# Patient Record
(demographics unavailable — no encounter records)

---

## 2025-05-27 NOTE — REASON FOR VISIT
[Initial Visit] : an initial visit for [FreeTextEntry2] : Left knee pain. Referred by Dr. Juarez Durand

## 2025-05-27 NOTE — CONSULT LETTER
[Dear  ___] : Dear  [unfilled], [FreeTextEntry1] : I had the pleasure of evaluating your patient in the office today for left knee pain secondary to lateral meniscus tear I have enclosed a copy of today's office notes for your charts and for your review.  Sincerely,   Chuy Haas M.D. Professor and

## 2025-05-27 NOTE — DISCUSSION/SUMMARY
[de-identified] : Patient is a pleasant 60 year old Female, with left knee pain secondary to lateral meniscus tear. A lengthy discussion was held regarding the patient's condition and treatment options including all risks, benefits, prognosis and outcomes of each were discussed in detail.  A discussion regarding conservative management at length which consists of 1) Monitor 2) Ice 3) NSAIDs 4) Exercise. I also discussed the possibility of shin pain from varicose veins. Patient will follow up with vascular surgery regarding varicose veins. Patient agrees with plan for conservative management at this time. If patient fails conservative treatment, discussion regarding other treatment options such as surgery will be discussed at next visit. The patient expressed understanding and all questions were answered. The patient will contact me on how she/he is doing otherwise follow up will be  in 2 months.

## 2025-05-27 NOTE — HISTORY OF PRESENT ILLNESS
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 She fell on all fours and hurt onto concrete sidewalk. She was taken to [facility]. She had continued pain and had x-rays and MRI done. She has pain in front of her knee and her shin really hurts. She has no pain at rest but increases randomly. Pain is intermittent. She does not take any pain meds. Has been doing physical therapy with no relief of pain. She denies any numbness or tingling.  Hx: hypothyroidism,

## 2025-05-27 NOTE — PHYSICAL EXAM
[de-identified] : Patient is a pleasant 60 year old Female, AAOx3 with no apparent distress, 41.6 BMI. Physical examination of left knee reveals normal contours with no deformity, skin intact, with no signs of infection, no erythema, no swelling, no discoloration, no distal lymphedema, no patholaxity, no muscle atrophy noted. ROM of left knee reveals flex/ext: 0-120. Motor strength 5/5 throughout the arc of motion. No neurological deficits noted. LLE Thigh 59cm, RLE Thigh 59cm, LLE Calf 45cm, RLE Calf 45cm.   [de-identified] : X-rays were obtained, and reviewed by me today (HSS, 3/4/25): 3 views of the left knee demonstrate normal alignment with no acute fracture or dislocation.   MRI obtained and reviewed by me today (HSS, 4/22/25): Demonstrating horizontal tear of body and anterior horn of lateral meniscus with parameniscal cyst.

## 2025-06-11 NOTE — PHYSICAL EXAM
[FreeTextEntry1] : PE: Constitutional: In NAD, calm and cooperative MSK  Inspection: no gross swelling identified  Palpation: No tenderness of the bilateral lower lumbar paraspinals, no significant TTP over L lower leg, mild TTP over posterior neck  Strength: 5/5 strength in bilateral upper and lower extremities  Reflexes: 2+ reflexes in bilateral UE and LE, negative clonus bilaterally, negative hoffmans bilaterally  Sensation: Intact to light touch in bilateral upper and lower extremities Special tests: Seated SLR: negative bilaterally Facet loading: positive bilaterally

## 2025-06-11 NOTE — DATA REVIEWED
[FreeTextEntry1] : MR L Knee 5/12/25 reviewed CBC 3/2021 reviewed CMP 3/2021 reviewed MR L Tib/Fib 5/2025 reviewed

## 2025-06-11 NOTE — ASSESSMENT
[FreeTextEntry1] : Ms. NILSON ALICIA is a 60 year old female who presents with persistent L anterior shin pain s/p fall, of unclear etiology at this time, possibly related to underlying stenosis. She also complains of chronic neck pain since fall, likely from underlying spondylosis. Denies any red flag signs. Will recommend: - Will obtain MRI C/L Spine - She will try Lidocaine 4% patches, 12 hours on and off - Continue PT 2-3x/week for stretching, strengthening, ROM exercises, HEP and modalities PRN including myofascial release, moist heat  RTC after imaging. Patient aware of red flag signs including any changes to their bowel/bladder control, groin numbness or new weakness. Patient knows to seek immediate attention by calling 911 or going to nearest ER if these symptoms appear.   This patient is being managed for a complex chronic pain that requires ongoing medical management. The nature of this condition requires a longitudinal relationship and monitoring over time for appropriate treatment.

## 2025-06-11 NOTE — HISTORY OF PRESENT ILLNESS
[FreeTextEntry1] : Ms. NILSON ALICIA is a 60 year old female who presents with leg and neck pain   Location: L superior shin, but also in posterior neck/upper back, no significant low back pain Onset: Fell on 3/1/2025, fell on sidewalk, fell forward onto bilateral shins and bracing with hands Provocation/Palliative: Worse at random times, no specific triggers Quality: Sharp, shooting Radiation: No radiation down arms, no radiation down legs from back Severity: Can be severe Timing: Not improving, episodes of shooting pains, comes and goes   Denies any associated numbness. Denies any associated leg weakness other from pain. Denies any loss of bowel/bladder control or any groin numbness. Previous medications trialed: Tries not to take medications Previous procedures relevant to complaint: None Conservative therapy tried?: Has tried PT since 3/2025, compression sock worsened pain

## 2025-07-11 NOTE — HISTORY OF PRESENT ILLNESS
[FreeTextEntry1] : This visit was conducted via phone call. Patient confirmed they were at home at [address] [place] [address] [address] [address] . Dr. Dawn was in the office at [address] [place] [address]. Patient consented to the televisit.  Ms. NILSON ALICIA is a 60 year old female who presents for follow up. At last visit, She was ordered an MRI C/L Spine, given Lidocaine patches, and continued on PT. She is feeling about the same, maybe slightly better but still discomfort over her anterior L shin. Her MRI L Spine has not been approved.    Location: L superior shin, but also in posterior neck/upper back, no significant low back pain Onset: Fell on 3/1/2025, fell on sidewalk, fell forward onto bilateral shins and bracing with hands Provocation/Palliative: Worse at random times, no specific triggers Quality: Sharp, shooting Radiation: No radiation down arms, no radiation down legs from back Severity: Can be severe Timing: Not improving, episodes of shooting pains, comes and goes  No bowel/bladder changes. No groin numbness.

## 2025-07-11 NOTE — ASSESSMENT
[FreeTextEntry1] : Ms. NILSON ALICIA is a 60 year old female who presents with persistent L anterior shin pain s/p fall, of unclear etiology at this time, possibly related to underlying stenosis. She also complains of chronic neck pain since fall, likely from underlying spondylosis. Denies any red flag signs. Will recommend: - MRI C Spine reviewed with patient - Still pending MRI L Spine. She is awaiting to undergo an X-ray L Spine prior - She will try Lidocaine 4% patches, 12 hours on and off - Continue PT 2-3x/week for stretching, strengthening, ROM exercises, HEP and modalities PRN including myofascial release, moist heat  RTC after MRI L Spine. Patient aware of red flag signs including any changes to their bowel/bladder control, groin numbness or new weakness. Patient knows to seek immediate attention by calling 911 or going to nearest ER if these symptoms appear.  This patient is being managed for a complex chronic pain that requires ongoing medical management. The nature of this condition requires a longitudinal relationship and monitoring over time for appropriate treatment.

## 2025-07-11 NOTE — HISTORY OF PRESENT ILLNESS
[FreeTextEntry1] : This visit was conducted via phone call. Patient confirmed they were at home at [address] [place] [address] [address] [place] [address] . Dr. Dawn was in the office at [address] [place] [address]. Patient consented to the televisit.  Ms. NILSON ALICIA is a 60 year old female who presents for follow up. At last visit, She was ordered an MRI C/L Spine, given Lidocaine patches, and continued on PT. She is feeling about the same, maybe slightly better but still discomfort over her anterior L shin. Her MRI L Spine has not been approved.    Location: L superior shin, but also in posterior neck/upper back, no significant low back pain Onset: Fell on 3/1/2025, fell on sidewalk, fell forward onto bilateral shins and bracing with hands Provocation/Palliative: Worse at random times, no specific triggers Quality: Sharp, shooting Radiation: No radiation down arms, no radiation down legs from back Severity: Can be severe Timing: Not improving, episodes of shooting pains, comes and goes  No bowel/bladder changes. No groin numbness.

## 2025-07-11 NOTE — DATA REVIEWED
[FreeTextEntry1] : MR L Knee 5/12/25 reviewed CBC 3/2021 reviewed CMP 3/2021 reviewed MR L Tib/Fib 5/2025 reviewed MR C Spine 6/2025 reviewed